# Patient Record
Sex: FEMALE | Race: WHITE | URBAN - METROPOLITAN AREA
[De-identification: names, ages, dates, MRNs, and addresses within clinical notes are randomized per-mention and may not be internally consistent; named-entity substitution may affect disease eponyms.]

---
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
## 2024-05-20 NOTE — TELEPHONE ENCOUNTER
D/w Dr Michel Ritter and his recommendation are below:    Schedule fasting labs in am, before her visit to repeat cmp and bile acid. Keep appt at 230 for visit with Dr Michel Ritter    If she is having itching she can start Vistaril 25 mg Q 8 hours prn itching Disp: 30 RF:0    If fasting labs tomorrow are still elevated, he will discuss with additional medications that can help bring down her bile acid levels    If she has any of the following symptoms she needs to go to triage ASAP  abdominal pain & 5 contractions in 1 hour, vaginal bleeding or spotting, or leaking of fluid, decreased FM  You should feel the baby move 10 times in an hour. Monitor this once a day. if you do not feel the baby move this go to the hospital formonitoring. Occupational Therapy    Patient not seen in therapy.     OT order received and chart reviewed.  Evaluation order cancelled by provider, however, treatment order active. Per review of PT note,  pt independent, no skilled OT need identified, will sign off. Please re-consult if there are any changes.      OBJECTIVE                          Therapy procedure time and total treatment time can be found documented on the Time Entry flowsheet